# Patient Record
Sex: FEMALE | Race: BLACK OR AFRICAN AMERICAN | NOT HISPANIC OR LATINO | Employment: FULL TIME | URBAN - METROPOLITAN AREA
[De-identification: names, ages, dates, MRNs, and addresses within clinical notes are randomized per-mention and may not be internally consistent; named-entity substitution may affect disease eponyms.]

---

## 2022-04-11 ENCOUNTER — HISTORICAL (OUTPATIENT)
Dept: ADMINISTRATIVE | Facility: HOSPITAL | Age: 37
End: 2022-04-11

## 2022-04-25 VITALS
WEIGHT: 242.06 LBS | OXYGEN SATURATION: 98 % | SYSTOLIC BLOOD PRESSURE: 113 MMHG | HEIGHT: 69 IN | DIASTOLIC BLOOD PRESSURE: 74 MMHG | BODY MASS INDEX: 35.85 KG/M2

## 2023-11-04 ENCOUNTER — HOSPITAL ENCOUNTER (EMERGENCY)
Facility: HOSPITAL | Age: 38
Discharge: HOME | End: 2023-11-04
Attending: STUDENT IN AN ORGANIZED HEALTH CARE EDUCATION/TRAINING PROGRAM
Payer: COMMERCIAL

## 2023-11-04 VITALS
TEMPERATURE: 98 F | HEIGHT: 68 IN | WEIGHT: 245 LBS | SYSTOLIC BLOOD PRESSURE: 120 MMHG | DIASTOLIC BLOOD PRESSURE: 79 MMHG | OXYGEN SATURATION: 97 % | RESPIRATION RATE: 18 BRPM | BODY MASS INDEX: 37.13 KG/M2 | HEART RATE: 69 BPM

## 2023-11-04 DIAGNOSIS — M25.579 ANKLE PAIN: ICD-10-CM

## 2023-11-04 LAB — URATE SERPL-MCNC: 6 MG/DL (ref 2.6–6)

## 2023-11-04 PROCEDURE — 84550 ASSAY OF BLOOD/URIC ACID: CPT | Performed by: STUDENT IN AN ORGANIZED HEALTH CARE EDUCATION/TRAINING PROGRAM

## 2023-11-04 PROCEDURE — 99284 EMERGENCY DEPT VISIT MOD MDM: CPT

## 2023-11-04 PROCEDURE — 63600175 PHARM REV CODE 636 W HCPCS: Performed by: STUDENT IN AN ORGANIZED HEALTH CARE EDUCATION/TRAINING PROGRAM

## 2023-11-04 PROCEDURE — 96372 THER/PROPH/DIAG INJ SC/IM: CPT | Performed by: STUDENT IN AN ORGANIZED HEALTH CARE EDUCATION/TRAINING PROGRAM

## 2023-11-04 RX ORDER — KETOROLAC TROMETHAMINE 30 MG/ML
30 INJECTION, SOLUTION INTRAMUSCULAR; INTRAVENOUS
Status: COMPLETED | OUTPATIENT
Start: 2023-11-04 | End: 2023-11-04

## 2023-11-04 RX ORDER — NAPROXEN 500 MG/1
500 TABLET ORAL 2 TIMES DAILY WITH MEALS
Qty: 60 TABLET | Refills: 0 | Status: SHIPPED | OUTPATIENT
Start: 2023-11-04

## 2023-11-04 RX ORDER — METHOCARBAMOL 500 MG/1
1000 TABLET, FILM COATED ORAL 3 TIMES DAILY
Qty: 30 TABLET | Refills: 0 | Status: SHIPPED | OUTPATIENT
Start: 2023-11-04 | End: 2023-11-09

## 2023-11-04 RX ADMIN — KETOROLAC TROMETHAMINE 30 MG: 30 INJECTION, SOLUTION INTRAMUSCULAR; INTRAVENOUS at 11:11

## 2023-11-04 NOTE — ED PROVIDER NOTES
Encounter Date: 11/4/2023       History     Chief Complaint   Patient presents with    Ankle Pain    Joint Swelling     Right ankle pain started Tuesday and then became swollen on Thursday. Denies any injury, denies numbness. There is an area of discoloration on the top of right foot that she states has been there a while.        HPI  Patient is a 38-year-old female no significant past medical history who presents to ER complaining of right ankle pain since Tuesday.  She denies any injuries, falls, traumas to the ankle.  She denies any fevers at home.  She notes some mild swelling noted to the medial aspect of the right knee.  She denies any difficulty ambulating.  Review of patient's allergies indicates:  No Known Allergies  No past medical history on file.  No past surgical history on file.  No family history on file.     Review of Systems   Constitutional:  Negative for fever.   HENT:  Negative for sore throat.    Eyes:  Negative for visual disturbance.   Respiratory:  Negative for shortness of breath.    Cardiovascular:  Negative for chest pain.   Gastrointestinal:  Negative for nausea.   Endocrine: Negative for polyuria.   Genitourinary:  Negative for dysuria.   Musculoskeletal:  Positive for arthralgias. Negative for back pain.        Right ankle pain   Skin:  Negative for rash.   Neurological:  Negative for weakness.   Hematological:  Does not bruise/bleed easily.   All other systems reviewed and are negative.      Physical Exam     Initial Vitals [11/04/23 1048]   BP Pulse Resp Temp SpO2   120/79 69 18 98.2 °F (36.8 °C) 97 %      MAP       --         Physical Exam    Nursing note and vitals reviewed.  Constitutional: Vital signs are normal. She appears well-developed and well-nourished. She is not diaphoretic. She is active.  Non-toxic appearance. She does not appear ill. No distress.   HENT:   Head: Normocephalic and atraumatic.   Eyes: Conjunctivae are normal. Pupils are equal, round, and reactive to  light. Right conjunctiva is not injected. Left conjunctiva is not injected.   Neck: Trachea normal. Neck supple.   Normal range of motion.   Full passive range of motion without pain.     Cardiovascular:  Normal rate, regular rhythm, S1 normal, S2 normal, intact distal pulses and normal pulses.           Pulmonary/Chest: Breath sounds normal. No respiratory distress. She has no wheezes.   Abdominal: Abdomen is soft. Bowel sounds are normal. There is no abdominal tenderness.   Musculoskeletal:         General: Tenderness and edema present. Normal range of motion.      Cervical back: Full passive range of motion without pain, normal range of motion and neck supple. No rigidity.      Right lower leg: No swelling. No edema.      Left lower leg: No swelling. No edema.      Comments: Full range of motion noted ankle without significant difficulty however mildly reproducible medial right ankle pain on palpation.  2+ DP/PT pulses noted to the right foot.  No overlying skin changes appreciated.     Neurological: She is alert and oriented to person, place, and time.   Skin: Skin is warm and dry. Capillary refill takes less than 2 seconds.         ED Course   Procedures  Labs Reviewed   URIC ACID - Normal          Imaging Results              X-Ray Ankle Complete Right (Final result)  Result time 11/04/23 11:35:03      Final result by Clarence Fu MD (11/04/23 11:35:03)                   Impression:      No acute osseous finding.      Electronically signed by: Clarence Fu MD  Date:    11/04/2023  Time:    11:35               Narrative:    EXAMINATION:  Right ankle three views    CLINICAL HISTORY:  Ankle pain    COMPARISON:  None    FINDINGS:  There is no acute fracture subluxation.  Joint spaces are maintained.  No erosive or proliferative changes.  No focal soft tissue swelling.                                       Medications   ketorolac injection 30 mg (30 mg Intramuscular Given 11/4/23 1144)     Medical Decision  Making  Amount and/or Complexity of Data Reviewed  Labs: ordered.  Radiology: ordered.    Risk  Prescription drug management.                          Medical Decision Making:   Initial Assessment:   Patient is a 38-year-old female no significant past medical history who presents to ER complaining of right ankle pain since Tuesday.    Differential Diagnosis:   Arthralgia, arthritis, gout, pseudogout, septic joint  Clinical Tests:   Lab Tests: Ordered and Reviewed  Radiological Study: Ordered and Reviewed  ED Management:  Patient vitals stable on arrival.  Patient afebrile.  No significant swelling or gross deformity noted to the ankle.  No warmth on palpation.  Patient endorses history of gout in her father side.  Obtain uric acid which was within normal limits.  X-ray negative for any acute abnormalities, no significant effusion.  No fracture.  Will treat for osteoarthritis, versus overuse.  Patient's ankle was wrapped.  Will discharge on Robaxin, naproxen.  Recommended she rest the ankle at night, ice as well.  Strict return precautions given symptoms worsen, change return to ER for further care.  Patient stable for discharge and amenable plan as noted.    Adam Rivera M.D.  Emergency Medicine          Clinical Impression:   Final diagnoses:  [M25.579] Ankle pain        ED Disposition Condition    Discharge Stable          ED Prescriptions       Medication Sig Dispense Start Date End Date Auth. Provider    naproxen (NAPROSYN) 500 MG tablet Take 1 tablet (500 mg total) by mouth 2 (two) times daily with meals. 60 tablet 11/4/2023 -- Adam Rivera MD    methocarbamoL (ROBAXIN) 500 MG Tab Take 2 tablets (1,000 mg total) by mouth 3 (three) times daily. for 5 days 30 tablet 11/4/2023 11/9/2023 Adam Rivera MD          Follow-up Information       Follow up With Specialties Details Why Contact Info    Your primary care provider  Schedule an appointment as soon as possible for a visit in 2 days For follow up     [facility] Emergency Medicine  If symptoms worsen [address] [facility] [id number]  [phone]             Adam Rivera MD  11/04/23 1200     H/O mitral valve repair